# Patient Record
Sex: FEMALE | Race: WHITE | Employment: UNEMPLOYED | URBAN - METROPOLITAN AREA
[De-identification: names, ages, dates, MRNs, and addresses within clinical notes are randomized per-mention and may not be internally consistent; named-entity substitution may affect disease eponyms.]

---

## 2018-08-16 LAB — TSH SERPL DL<=0.05 MIU/L-ACNC: 0.02 UIU/ML

## 2019-03-27 VITALS
TEMPERATURE: 97.2 F | SYSTOLIC BLOOD PRESSURE: 132 MMHG | BODY MASS INDEX: 24.59 KG/M2 | HEART RATE: 91 BPM | DIASTOLIC BLOOD PRESSURE: 74 MMHG | HEIGHT: 64 IN | WEIGHT: 144 LBS

## 2019-03-27 RX ORDER — ALBUTEROL SULFATE 2.5 MG/3ML
2.5 SOLUTION RESPIRATORY (INHALATION) 4 TIMES DAILY
COMMUNITY

## 2019-03-27 RX ORDER — BENZONATATE 200 MG/1
200 CAPSULE ORAL
COMMUNITY

## 2019-03-27 RX ORDER — MONTELUKAST SODIUM 10 MG/1
10 TABLET ORAL NIGHTLY
COMMUNITY

## 2019-03-27 RX ORDER — ALBUTEROL SULFATE 90 UG/1
2 AEROSOL, METERED RESPIRATORY (INHALATION)
COMMUNITY

## 2019-04-03 VITALS
HEIGHT: 63 IN | DIASTOLIC BLOOD PRESSURE: 74 MMHG | WEIGHT: 144 LBS | BODY MASS INDEX: 25.52 KG/M2 | SYSTOLIC BLOOD PRESSURE: 132 MMHG | TEMPERATURE: 97.2 F | HEART RATE: 91 BPM

## 2019-05-07 ENCOUNTER — OFFICE VISIT (OUTPATIENT)
Dept: PRIMARY CARE CLINIC | Age: 54
End: 2019-05-07
Payer: COMMERCIAL

## 2019-05-07 ENCOUNTER — TELEPHONE (OUTPATIENT)
Dept: PODIATRY | Age: 54
End: 2019-05-07

## 2019-05-07 ENCOUNTER — TELEPHONE (OUTPATIENT)
Dept: PRIMARY CARE CLINIC | Age: 54
End: 2019-05-07

## 2019-05-07 VITALS
DIASTOLIC BLOOD PRESSURE: 78 MMHG | BODY MASS INDEX: 24.89 KG/M2 | HEIGHT: 64 IN | WEIGHT: 145.8 LBS | HEART RATE: 88 BPM | TEMPERATURE: 97 F | SYSTOLIC BLOOD PRESSURE: 130 MMHG

## 2019-05-07 DIAGNOSIS — E03.9 HYPOTHYROIDISM (ACQUIRED): ICD-10-CM

## 2019-05-07 DIAGNOSIS — E78.5 HYPERLIPIDEMIA LDL GOAL <100: ICD-10-CM

## 2019-05-07 DIAGNOSIS — F90.2 ATTENTION DEFICIT HYPERACTIVITY DISORDER (ADHD), COMBINED TYPE, MODERATE, IN PARTIAL REMISSION: ICD-10-CM

## 2019-05-07 DIAGNOSIS — F41.9 ANXIETY: Primary | ICD-10-CM

## 2019-05-07 DIAGNOSIS — Z72.0 CURRENT NICOTINE USE: ICD-10-CM

## 2019-05-07 DIAGNOSIS — F41.1 GENERALIZED ANXIETY DISORDER: Primary | ICD-10-CM

## 2019-05-07 PROCEDURE — 99214 OFFICE O/P EST MOD 30 MIN: CPT | Performed by: INTERNAL MEDICINE

## 2019-05-07 RX ORDER — ALPRAZOLAM 1 MG/1
1 TABLET ORAL 3 TIMES DAILY PRN
COMMUNITY
End: 2019-06-03

## 2019-05-07 RX ORDER — ALPRAZOLAM 0.25 MG/1
0.25 TABLET ORAL NIGHTLY PRN
Qty: 30 TABLET | Refills: 2 | Status: SHIPPED | OUTPATIENT
Start: 2019-05-07 | End: 2019-05-07 | Stop reason: CLARIF

## 2019-05-07 RX ORDER — DEXTROAMPHETAMINE SACCHARATE, AMPHETAMINE ASPARTATE, DEXTROAMPHETAMINE SULFATE AND AMPHETAMINE SULFATE 5; 5; 5; 5 MG/1; MG/1; MG/1; MG/1
20 TABLET ORAL 2 TIMES DAILY
Qty: 60 TABLET | Refills: 0 | Status: SHIPPED | OUTPATIENT
Start: 2019-05-07 | End: 2019-05-24 | Stop reason: SDUPTHER

## 2019-05-07 RX ORDER — ALPRAZOLAM 1 MG/1
1 TABLET ORAL 3 TIMES DAILY PRN
Qty: 90 TABLET | Refills: 2 | Status: SHIPPED | OUTPATIENT
Start: 2019-05-07 | End: 2019-06-03 | Stop reason: SDUPTHER

## 2019-05-07 ASSESSMENT — ENCOUNTER SYMPTOMS
ALLERGIC/IMMUNOLOGIC NEGATIVE: 1
RESPIRATORY NEGATIVE: 1
EYES NEGATIVE: 1
GASTROINTESTINAL NEGATIVE: 1

## 2019-05-07 NOTE — TELEPHONE ENCOUNTER
The correct dose should be 1 mg 3 times a day #90 with 2 refills. Please telephone the cyst we are with the new system and he gets little [place] for me.

## 2019-05-07 NOTE — PROGRESS NOTES
[facility]  Phone: [phone]  Fax: [phone]    Patient:  Caden Murdock 48 y.o. female                                 Date of Service: 5/7/19                            Chiefcomplaint:   Chief Complaint   Patient presents with    Anxiety    Hypothyroidism    Hypertension    ADHD         History of Present Illness: The patient is a 48 y.o. female  presented to the clinic with complaints as above. Attention deficit hyperactivity disorder currently well controlled on current medical therapy. We'll prefer to take medication 3 times a day however quite likely she will be able to sleep - she already has associated insomnia secondary to anxiety    Anxiety disorder currently under control, mostly nocturnal, treated as needed with alprazolam    Essential hypertension in a patient who is a smoker, 48years of age    Review of Systems:   Review of Systems   Constitutional: Negative. HENT: Negative. Eyes: Negative. Respiratory: Negative. Cardiovascular: Negative. Gastrointestinal: Negative. Endocrine: Negative. Genitourinary: Negative. Musculoskeletal: Negative. Allergic/Immunologic: Negative. Neurological: Negative. Hematological: Negative. Psychiatric/Behavioral: Negative. All other systems reviewed and are negative.       Past Medical History:      Diagnosis Date    ADHD (attention deficit hyperactivity disorder)     Anxiety     Chest pain     COPD (chronic obstructive pulmonary disease) (HCC)     DJD (degenerative joint disease)     Fallopian tube cancer, carcinoma (HCC)     received radiation and chemo    Full dentures     TEETH IMPLANTS - ALL     Headache     History of hysterectomy     Hyperlipidemia     Hypertension     Hypothyroidism     Kidney stones     Osteoarthritis     S/P Botox injection     FACE    Smoker     Thyroid disease     hypothyroidism    Vitamin D deficiency        Past Surgical History:        Procedure Laterality Date  ABDOMINOPLASTY      X2    BREAST ENHANCEMENT SURGERY      BREAST SURGERY       SECTION      X2    CORONARY ANGIOPLASTY  08    COSMETIC SURGERY      tummy tuck (upper and lower), liposuction,buttocks lift, bilateral breast augmentation, teeth    DIAGNOSTIC CARDIAC CATH LAB PROCEDURE  08    PATENT CORONARIES, NORMAL LV FUNCTION    HYSTERECTOMY      LIPOSUCTION      ABDOMINAL X2    LIPOSUCTION      THIGHS X1    OTHER SURGICAL HISTORY N/A 5/15/2013    Mini Arc Sling    TONSILLECTOMY AND ADENOIDECTOMY      WISDOM TOOTH EXTRACTION         Allergies:    Patient has no known allergies. Social History:   Social History     Socioeconomic History    Marital status:      Spouse name: Not on file    Number of children: Not on file    Years of education: Not on file    Highest education level: Not on file   Occupational History    Not on file   Social Needs    Financial resource strain: Not on file    Food insecurity:     Worry: Not on file     Inability: Not on file    Transportation needs:     Medical: Not on file     Non-medical: Not on file   Tobacco Use    Smoking status: Current Every Day Smoker     Packs/day: 1.00     Types: Cigarettes    Smokeless tobacco: Never Used    Tobacco comment: smokes about 1/2 ppd now. Substance and Sexual Activity    Alcohol use:  Yes     Alcohol/week: 0.0 oz     Comment: occ    Drug use: No    Sexual activity: Not Currently   Lifestyle    Physical activity:     Days per week: Not on file     Minutes per session: Not on file    Stress: Not on file   Relationships    Social connections:     Talks on phone: Not on file     Gets together: Not on file     Attends [employer] service: Not on file     Active member of club or organization: Not on file     Attends meetings of clubs or organizations: Not on file     Relationship status: Not on file    Intimate partner violence:     Fear of current or ex partner: Not on file     Emotionally abused: Not on file     Physically abused: Not on file     Forced sexual activity: Not on file   Other Topics Concern    Not on file   Social History Narrative    Not on file        Family History:       Problem Relation Age of Onset    Cancer Maternal Grandmother     Ovarian Cancer Maternal Grandmother     Heart Disease Maternal Grandfather     Cancer Paternal Grandmother     Ovarian Cancer Paternal Grandmother     Heart Disease Paternal Grandfather     Thyroid Disease Mother     Thyroid Disease Father     Kidney Disease Father         KIDNEY STONES    Heart Attack Sister         ONE HAD MI AGE 21 AND ONE HAD MI AGE 31    Ovarian Cancer Maternal Aunt     Ovarian Cancer Paternal Aunt        BP Readings from Last 3 Encounters:   05/07/19 130/78   02/06/19 132/74   02/06/19 132/74       Physical Exam:    Vitals: /78 (Site: Right Upper Arm, Position: Sitting)   Pulse 88   Temp 97 °F (36.1 °C)   Ht 5' 4\" (1.626 m)   Wt 145 lb 12.8 oz (66.1 kg)   BMI 25.03 kg/m²   General Appearance: Well developed, awake, alert, oriented, no acute distress. Smells of tobacco.  HEENT: Normocephalic,atraumatic. PERRL, EOM's intact, EAC without erythemaor swelling, no pallor or icterus. Oral cavity with tongue discoloration secondary to tobacco stains, same with teeth. Well-maintained dentition. No postnasal drip. No masses or lesions. No ulcers. Neck: Supple, symmetrical, trachea midline. No JVD. Chest wall/Lung: Clear to auscultation bilaterally,  respirations unlabored. No ronchi/wheezing/rales, diffusely decreased breathing sounds  Heart[de-identified]  Regular rate and rhythm, S1and S2 normal, no murmur, rub or gallop. Abdomen: Soft, non-tender, bowel sounds normoactive, no masses, no organomegaly  Extremities:  Extremities normal, atraumatic, no cyanosis. no edema.   Skin: Skin color, texture, turgor normal, no rashes or lesions  Musculokeletal: ROM grossly normal in all joints of extremities, no obvious joint HFA) 108 (90 Base) MCG/ACT inhaler Inhale 2 puffs into the lungs every 4-6 hours as needed for Wheezing      fluticasone-salmeterol (ADVAIR DISKUS) 500-50 MCG/DOSE diskus inhaler Inhale 1 puff into the lungs 2 times daily      tiotropium (SPIRIVA HANDIHALER) 18 MCG inhalation capsule Inhale 18 mcg into the lungs daily      OXYGEN 2 L by Nasal route as needed. No current facility-administered medications for this visit. Estiven Hand MD       This document may have been prepared at least partiallythrough the use of voice recognition software. Although effort is taken to assure the accuracy of this document, it is possible that grammatical, syntax,  or spelling errors may occur.

## 2019-05-07 NOTE — TELEPHONE ENCOUNTER
Medication issue xanax was sent to pharm  She states she takes 1mg TID  And was given Xanax . 25mg and only 30 was given to her

## 2019-05-08 DIAGNOSIS — F41.9 ANXIETY: ICD-10-CM

## 2019-05-08 RX ORDER — ALPRAZOLAM 1 MG/1
1 TABLET ORAL 3 TIMES DAILY PRN
Qty: 90 TABLET | Refills: 2 | Status: CANCELLED | OUTPATIENT
Start: 2019-05-08 | End: 2019-06-07

## 2019-05-09 RX ORDER — DEXTROAMPHETAMINE SACCHARATE, AMPHETAMINE ASPARTATE, DEXTROAMPHETAMINE SULFATE AND AMPHETAMINE SULFATE 5; 5; 5; 5 MG/1; MG/1; MG/1; MG/1
20 TABLET ORAL 3 TIMES DAILY
COMMUNITY
End: 2019-06-03

## 2019-05-09 NOTE — TELEPHONE ENCOUNTER
Med list has been corrected for the Xanax and the Adderall . Meds were verbally called to [facility] on [address].   Adderal order was short 30 pills so her next refill will be quantity of 90

## 2019-05-09 NOTE — TELEPHONE ENCOUNTER
Pt called this am to state that her Adderall 20 mg is supposed to be TID, not BID, she picked up the script for 60 pills, but now needs the additional 30

## 2019-05-24 DIAGNOSIS — F41.9 ANXIETY: ICD-10-CM

## 2019-05-24 DIAGNOSIS — F90.2 ATTENTION DEFICIT HYPERACTIVITY DISORDER (ADHD), COMBINED TYPE, MODERATE, IN PARTIAL REMISSION: ICD-10-CM

## 2019-05-24 NOTE — TELEPHONE ENCOUNTER
Patient called in stating she is in need of medication to fill her until next appt. She said she has been taking it 3 times daily. She will be out of prescription by Sunday. Please advise If this is possible thank you.

## 2019-05-28 RX ORDER — DEXTROAMPHETAMINE SACCHARATE, AMPHETAMINE ASPARTATE, DEXTROAMPHETAMINE SULFATE AND AMPHETAMINE SULFATE 5; 5; 5; 5 MG/1; MG/1; MG/1; MG/1
20 TABLET ORAL 3 TIMES DAILY
Qty: 90 TABLET | Refills: 0 | Status: SHIPPED | OUTPATIENT
Start: 2019-05-28 | End: 2019-06-03 | Stop reason: SDUPTHER

## 2019-06-03 ENCOUNTER — OFFICE VISIT (OUTPATIENT)
Dept: PRIMARY CARE CLINIC | Age: 54
End: 2019-06-03
Payer: COMMERCIAL

## 2019-06-03 VITALS
SYSTOLIC BLOOD PRESSURE: 122 MMHG | WEIGHT: 142.6 LBS | HEART RATE: 93 BPM | DIASTOLIC BLOOD PRESSURE: 78 MMHG | TEMPERATURE: 97.1 F | BODY MASS INDEX: 24.48 KG/M2

## 2019-06-03 DIAGNOSIS — F41.9 ANXIETY: ICD-10-CM

## 2019-06-03 DIAGNOSIS — F90.2 ATTENTION DEFICIT HYPERACTIVITY DISORDER (ADHD), COMBINED TYPE: Primary | ICD-10-CM

## 2019-06-03 DIAGNOSIS — F90.2 ATTENTION DEFICIT HYPERACTIVITY DISORDER (ADHD), COMBINED TYPE, MODERATE, IN PARTIAL REMISSION: ICD-10-CM

## 2019-06-03 DIAGNOSIS — E03.4 HYPOTHYROIDISM DUE TO ACQUIRED ATROPHY OF THYROID: ICD-10-CM

## 2019-06-03 PROCEDURE — 99214 OFFICE O/P EST MOD 30 MIN: CPT | Performed by: INTERNAL MEDICINE

## 2019-06-03 RX ORDER — DEXTROAMPHETAMINE SACCHARATE, AMPHETAMINE ASPARTATE, DEXTROAMPHETAMINE SULFATE AND AMPHETAMINE SULFATE 5; 5; 5; 5 MG/1; MG/1; MG/1; MG/1
20 TABLET ORAL 3 TIMES DAILY
Qty: 90 TABLET | Refills: 0 | Status: SHIPPED | OUTPATIENT
Start: 2019-07-03 | End: 2019-06-03 | Stop reason: SDUPTHER

## 2019-06-03 RX ORDER — DEXTROAMPHETAMINE SACCHARATE, AMPHETAMINE ASPARTATE, DEXTROAMPHETAMINE SULFATE AND AMPHETAMINE SULFATE 5; 5; 5; 5 MG/1; MG/1; MG/1; MG/1
20 TABLET ORAL 3 TIMES DAILY
Qty: 90 TABLET | Refills: 0 | Status: SHIPPED | OUTPATIENT
Start: 2019-08-03 | End: 2019-09-02

## 2019-06-03 RX ORDER — LEVOTHYROXINE SODIUM 0.15 MG/1
TABLET ORAL
Qty: 90 TABLET | Refills: 3 | Status: SHIPPED | OUTPATIENT
Start: 2019-06-03

## 2019-06-03 RX ORDER — ALPRAZOLAM 1 MG/1
1 TABLET ORAL 3 TIMES DAILY PRN
Qty: 90 TABLET | Refills: 2 | Status: SHIPPED | OUTPATIENT
Start: 2019-06-03 | End: 2019-07-03

## 2019-06-03 ASSESSMENT — ENCOUNTER SYMPTOMS
ALLERGIC/IMMUNOLOGIC NEGATIVE: 1
EYES NEGATIVE: 1
GASTROINTESTINAL NEGATIVE: 1
RESPIRATORY NEGATIVE: 1

## 2019-06-03 NOTE — PROGRESS NOTES
[address] [facility],[address]  Phone: [phone]  Fax: [phone]    Patient:  Nas Valenzuela 48 y.o. female                                 Date of Service: 6/3/19                            Chief complaint:   Chief Complaint   Patient presents with    Anxiety         History of Present Illness: The patient is a 48 y.o. female  presented to the clinic with complaints as above. Attention deficit hyperactivity disorder and refills - no new complaints      Anxiety disorder here for refills-no new complaints    Nicotine abuse disorder, continues to smoke daily. Review of Systems:   Review of Systems   Constitutional: Negative. HENT: Negative. Eyes: Negative. Respiratory: Negative. Cardiovascular: Negative. Gastrointestinal: Negative. Endocrine: Negative. Genitourinary: Negative. Musculoskeletal: Negative. Allergic/Immunologic: Negative. Neurological: Negative. Hematological: Negative. Psychiatric/Behavioral: Negative. All other systems reviewed and are negative.       Past Medical History:      Diagnosis Date    ADHD (attention deficit hyperactivity disorder)     Anxiety     Chest pain     COPD (chronic obstructive pulmonary disease) (HCC)     DJD (degenerative joint disease)     Fallopian tube cancer, carcinoma (HCC)     received radiation and chemo    Full dentures     TEETH IMPLANTS - ALL     Headache     History of hysterectomy     Hyperlipidemia     Hypertension     Hypothyroidism     Kidney stones     Osteoarthritis     S/P Botox injection     FACE    Smoker     Thyroid disease     hypothyroidism    Vitamin D deficiency        Past Surgical History:        Procedure Laterality Date    ABDOMINOPLASTY      X2    BREAST ENHANCEMENT SURGERY      BREAST SURGERY       SECTION      X2    CORONARY ANGIOPLASTY  08    COSMETIC SURGERY      tummy tuck (upper and lower), liposuction,buttocks lift, bilateral breast augmentation, teeth    DIAGNOSTIC CARDIAC CATH LAB PROCEDURE  05/06/08    PATENT CORONARIES, NORMAL LV FUNCTION    HYSTERECTOMY      LIPOSUCTION      ABDOMINAL X2    LIPOSUCTION      THIGHS X1    OTHER SURGICAL HISTORY N/A 5/15/2013    Mini Arc Sling    TONSILLECTOMY AND ADENOIDECTOMY      WISDOM TOOTH EXTRACTION         Allergies:    Patient has no known allergies. Social History:   Social History     Socioeconomic History    Marital status:      Spouse name: Not on file    Number of children: Not on file    Years of education: Not on file    Highest education level: Not on file   Occupational History    Not on file   Social Needs    Financial resource strain: Not on file    Food insecurity:     Worry: Not on file     Inability: Not on file    Transportation needs:     Medical: Not on file     Non-medical: Not on file   Tobacco Use    Smoking status: Current Every Day Smoker     Packs/day: 1.00     Types: Cigarettes    Smokeless tobacco: Never Used    Tobacco comment: smokes about 1/2 ppd now. Substance and Sexual Activity    Alcohol use:  Yes     Alcohol/week: 0.0 oz     Comment: occ    Drug use: No    Sexual activity: Not Currently   Lifestyle    Physical activity:     Days per week: Not on file     Minutes per session: Not on file    Stress: Not on file   Relationships    Social connections:     Talks on phone: Not on file     Gets together: Not on file     Attends Protestant service: Not on file     Active member of club or organization: Not on file     Attends meetings of clubs or organizations: Not on file     Relationship status: Not on file    Intimate partner violence:     Fear of current or ex partner: Not on file     Emotionally abused: Not on file     Physically abused: Not on file     Forced sexual activity: Not on file   Other Topics Concern    Not on file   Social History Narrative    Not on file        Family History:       Problem Relation Age of Onset    Cancer Maternal Grandmother  Ovarian Cancer Maternal Grandmother     Heart Disease Maternal Grandfather     Cancer Paternal Grandmother     Ovarian Cancer Paternal Grandmother     Heart Disease Paternal Grandfather     Thyroid Disease Mother     Thyroid Disease Father     Kidney Disease Father         KIDNEY STONES    Heart Attack Sister         ONE HAD MI AGE 21 AND ONE HAD MI AGE 32    Ovarian Cancer Maternal Aunt     Ovarian Cancer Paternal Aunt        BP Readings from Last 3 Encounters:   06/03/19 122/78   05/07/19 130/78   02/06/19 132/74       Physical Exam:    Vitals: /78 (Site: Right Upper Arm, Position: Sitting)   Pulse 93   Temp 97.1 °F (36.2 °C)   Wt 142 lb 9.6 oz (64.7 kg)   BMI 24.48 kg/m²   General Appearance: Well developed, awake, alert, oriented, no acute distress  HEENT: Normocephalic,atraumatic. PERRL, EOM's intact, EAC without erythemaor swelling, no pallor or icterus. Neck: Supple, symmetrical, trachea midline. No JVD. Chest wall/Lung: Clear to auscultation bilaterally,  respirations unlabored. No ronchi/wheezing/rales  Heart[de-identified]  Regular rate and rhythm, S1and S2 normal, no murmur, rub or gallop. Abdomen: Soft, non-tender, bowel sounds normoactive, no masses, no organomegaly  Extremities:  Extremities normal, atraumatic, no cyanosis. no edema. Skin: Skin color, texture, turgor normal, no rashes or lesions  Musculokeletal: ROM grossly normal in all joints of extremities, no obvious joint swelling. Lymph nodes: no lymph node enlargement appreciated  Neurologic:   Alert&Oriented. Normal gait and coordination  No focal neurological deficits appreaciated         Psychiatric: has a normal mood and affect. Behavior is normal.       Assessment and Plan:         1. Anxiety  Stable present time on current medical therapy. - ALPRAZolam (XANAX) 1 MG tablet; Take 1 tablet by mouth 3 times daily as needed for Sleep for up to 30 days. Dispense: 90 tablet; Refill: 2    2.  Attention deficit hyperactivity disorder (ADHD), combined type  Stable at present, chronic medical therapy. I encourage further reading and education about your health conditions. Information on many healthconditions is provided by the [employer]: https://familydoctor. org/  Please bring any questions to me at your next visit. Return to Office: Return in about 3 months (around 8/26/2019). Medication List:    Current Outpatient Medications   Medication Sig Dispense Refill    ALPRAZolam (XANAX) 1 MG tablet Take 1 tablet by mouth 3 times daily as needed for Sleep for up to 30 days. 90 tablet 2    levothyroxine (SYNTHROID) 150 MCG tablet TAKE 1 TABLET BY MOUTH EVERY DAY 90 tablet 3    [START ON 8/3/2019] amphetamine-dextroamphetamine (ADDERALL) 20 MG tablet Take 1 tablet by mouth 3 times daily for 30 days. 90 tablet 0    benzonatate (TESSALON) 200 MG capsule 200 mg One every 6 hours as needed for cough      albuterol (PROVENTIL) (2.5 MG/3ML) 0.083% nebulizer solution Take 2.5 mg by nebulization 4 times daily      albuterol sulfate HFA (PROAIR HFA) 108 (90 Base) MCG/ACT inhaler Inhale 2 puffs into the lungs every 4-6 hours as needed for Wheezing      montelukast (SINGULAIR) 10 MG tablet Take 10 mg by mouth nightly      fluticasone-salmeterol (ADVAIR DISKUS) 500-50 MCG/DOSE diskus inhaler Inhale 1 puff into the lungs 2 times daily      tiotropium (SPIRIVA HANDIHALER) 18 MCG inhalation capsule Inhale 18 mcg into the lungs daily      simvastatin (ZOCOR) 10 MG tablet TAKE 1 TABLET BY MOUTH EVERY EVENING 30 tablet 3    OXYGEN 2 L by Nasal route as needed. No current facility-administered medications for this visit. Osiel Early MD       This document may have been prepared at least partiallythrough the use of voice recognition software. Although effort is taken to assure the accuracy of this document, it is possible that grammatical, syntax,  or spelling errors may occur.